# Patient Record
(demographics unavailable — no encounter records)

---

## 2025-03-25 NOTE — PHYSICAL EXAM
[Normal Appearance] : normal appearance [Well Groomed] : well groomed [General Appearance - In No Acute Distress] : no acute distress [Edema] : no peripheral edema [Respiration, Rhythm And Depth] : normal respiratory rhythm and effort [Exaggerated Use Of Accessory Muscles For Inspiration] : no accessory muscle use [Abdomen Soft] : soft [Abdomen Tenderness] : non-tender [Costovertebral Angle Tenderness] : no ~M costovertebral angle tenderness [Urethral Meatus] : meatus normal [Penis Abnormality] : normal uncircumcised penis [Urinary Bladder Findings] : the bladder was normal on palpation [Scrotum] : the scrotum was normal [Epididymis] : the epididymides were normal [Testes Tenderness] : no tenderness of the testes [Testes Mass (___cm)] : there were no testicular masses [Normal Station and Gait] : the gait and station were normal for the patient's age [] : no rash [No Focal Deficits] : no focal deficits [Oriented To Time, Place, And Person] : oriented to person, place, and time [Affect] : the affect was normal [Mood] : the mood was normal [No Palpable Adenopathy] : no palpable adenopathy [Chaperone Present] : A chaperone was present in the examining room during all aspects of the physical examination [FreeTextEntry2] :  Omari Hardy

## 2025-03-25 NOTE — LETTER BODY
[Dear  ___] : Dear  [unfilled], [Consult Letter:] : I had the pleasure of evaluating your patient, [unfilled]. [Please see my note below.] : Please see my note below. [Consult Closing:] : Thank you very much for allowing me to participate in the care of this patient.  If you have any questions, please do not hesitate to contact me. [Sincerely,] : Sincerely, [FreeTextEntry3] :  Uche Esparza MD.

## 2025-03-25 NOTE — HISTORY OF PRESENT ILLNESS
[FreeTextEntry1] : 09/12/2024 Pt is a 43 y/o male who presents today for a follow up visit s/p right ureteroscopy, Removal of right ureteral stone and insertion of double-j ureteral stent 8/30/2024.  Here today for stent removal.  Stent removed intact. No complications.  Stone is made up of Calcium Oxalate monohydrate. Has punctate left lower pole kidney stone. No intervention necessary.  Will schedule for US renal and Litho link to evaluate stone risk analysis.   RTC 1 month Renal US and LithoLink 24 hour urine testing for stone risk analysis and prevention.    03/25/2025, 44 y/o male presents with a follow up visit for Left Renal Stone  S/p right ureteroscopy, Removal of right ureteral stone and insertion of double-j ureteral stent 8/30/2024. Stent removed intact on 09/12/2024 Stone is made up of Calcium Oxalate monohydrate.  Patient reports he was seen at [facility] on 3/24/25 for severe RLQ pain. CT revealed no stones, no hydro. Today patient reports pain has subsided.  Upon further review of CT a questionable punctate stone was visualized in right kidney.   Patient may have spontaneously passed the punctate stone.   Renal Sonogram today  Findings: There is a 5 mm echogenic non obstructive stone in the lower pole of the left kidney with distal shadowing. Both kidneys are normal in size and echogenicity without hydronephrosis or solid masses visualized  As patient is asymptomatic. No intervention at necessary   Patient advised to complete LithoLink 24 hour urine testing for stone risk analysis and prevention.  Patient reports he was seen at [facility] for LLQ pain on 3/24/25.   Patient will call to make an appointment after completing LithoLink

## 2025-03-25 NOTE — END OF VISIT
[Time Spent: ___ minutes] : I have spent [unfilled] minutes of time on the encounter which excludes teaching and separately reported services. [FreeTextEntry4] :  This note was written by Omari Hardy on 03/25/2025 actively solely Uche Casanova M.D. I, Omari Hardy, am scribing for and in the presence of Uche Casanova M.D. in the following sections HISTORY OF PRESENT ILLNESS, PAST MEDICAL/FAMILY/SOCIAL HISTORY; REVIEW OF SYSTEMS; VITAL SIGNS; PHYSICAL EXAM; ASSESSMENT/PLAN.     All medical record entries made by this scribe at my, Uche Casanova M.D. direction and personally dictated by me on 03/25/2025. I personally performed the services described in the documentation, reviewed the documentation recorded by the scribe in my presence, and it accurately and completely records my words and actions.